# Patient Record
Sex: FEMALE | URBAN - METROPOLITAN AREA
[De-identification: names, ages, dates, MRNs, and addresses within clinical notes are randomized per-mention and may not be internally consistent; named-entity substitution may affect disease eponyms.]

---

## 2019-11-11 ENCOUNTER — OFFICE VISIT (OUTPATIENT)
Dept: FAMILY MEDICINE | Facility: CLINIC | Age: 29
End: 2019-11-11
Payer: COMMERCIAL

## 2019-11-11 VITALS
TEMPERATURE: 97.6 F | HEART RATE: 61 BPM | SYSTOLIC BLOOD PRESSURE: 108 MMHG | BODY MASS INDEX: 22.79 KG/M2 | RESPIRATION RATE: 14 BRPM | WEIGHT: 120.7 LBS | DIASTOLIC BLOOD PRESSURE: 80 MMHG | HEIGHT: 61 IN | OXYGEN SATURATION: 99 %

## 2019-11-11 DIAGNOSIS — S86.919D: ICD-10-CM

## 2019-11-11 DIAGNOSIS — F19.20 CHEMICAL DEPENDENCY (H): ICD-10-CM

## 2019-11-11 DIAGNOSIS — E55.9 VITAMIN D DEFICIENCY: ICD-10-CM

## 2019-11-11 DIAGNOSIS — L30.8 OTHER ECZEMA: Primary | ICD-10-CM

## 2019-11-11 LAB
DEPRECATED CALCIDIOL+CALCIFEROL SERPL-MC: 10 UG/L (ref 20–75)
HGB BLD-MCNC: 15.1 G/DL (ref 11.7–15.7)

## 2019-11-11 PROCEDURE — 36415 COLL VENOUS BLD VENIPUNCTURE: CPT | Performed by: FAMILY MEDICINE

## 2019-11-11 PROCEDURE — 82306 VITAMIN D 25 HYDROXY: CPT | Performed by: FAMILY MEDICINE

## 2019-11-11 PROCEDURE — 99203 OFFICE O/P NEW LOW 30 MIN: CPT | Mod: 25 | Performed by: FAMILY MEDICINE

## 2019-11-11 PROCEDURE — 90686 IIV4 VACC NO PRSV 0.5 ML IM: CPT | Performed by: FAMILY MEDICINE

## 2019-11-11 PROCEDURE — 90471 IMMUNIZATION ADMIN: CPT | Performed by: FAMILY MEDICINE

## 2019-11-11 PROCEDURE — 85018 HEMOGLOBIN: CPT | Performed by: FAMILY MEDICINE

## 2019-11-11 RX ORDER — TRIAMCINOLONE ACETONIDE 1 MG/G
CREAM TOPICAL 2 TIMES DAILY
Qty: 80 G | Refills: 0 | Status: SHIPPED | OUTPATIENT
Start: 2019-11-11

## 2019-11-11 ASSESSMENT — PAIN SCALES - GENERAL: PAINLEVEL: EXTREME PAIN (8)

## 2019-11-11 ASSESSMENT — MIFFLIN-ST. JEOR: SCORE: 1218.83

## 2019-11-11 NOTE — PROGRESS NOTES
"Subjective     Diana Rob is a 29 year old female who presents to clinic today for the following health issues:    HPI   Rash      Duration: 1 month    Description  Location: both hands  Itching: moderate    Intensity:  moderate    Accompanying signs and symptoms: redness    History (similar episodes/previous evaluation): None    Precipitating or alleviating factors:  New exposures:  None  Recent travel: no      Therapies tried and outcome: hydrocortisone cream -  not effective    [place] [facility]- [facility], (since may 2019, not been home)  Lives in [place]. Was advised to keep taking vitamin D and iron  Wondering if that is why having lower leg pain- aches, worse in am and better though the day.    PROBLEMS TO ADD ON...    BP Readings from Last 3 Encounters:   11/11/19 108/80    Wt Readings from Last 3 Encounters:   11/11/19 54.7 kg (120 lb 11.2 oz)                    PROBLEMS TO ADD ON...  Reviewed and updated as needed this visit by Provider  Allergies  Problems  Med Hx  Surg Hx         Review of Systems   ROS COMP: Constitutional, HEENT, cardiovascular, pulmonary, GI, , musculoskeletal, neuro, skin, endocrine and psych systems are negative, except as otherwise noted.      Objective    /80 (BP Location: Left arm, Patient Position: Sitting, Cuff Size: Adult Regular)   Pulse 61   Temp 97.6  F (36.4  C) (Oral)   Resp 14   Ht 1.556 m (5' 1.25\")   Wt 54.7 kg (120 lb 11.2 oz)   LMP 11/05/2019 (Approximate)   SpO2 99%   BMI 22.62 kg/m    Body mass index is 22.62 kg/m .  Physical Exam   Hands eczematous rash with dry peeling skin on the palm and dorsum of hands     GENERAL: healthy, alert and no distress  NECK: no adenopathy, no asymmetry, masses, or scars and thyroid normal to palpation  RESP: lungs clear to auscultation - no rales, rhonchi or wheezes  CV: regular rate and rhythm, normal S1 S2, no S3 or S4, no murmur, click or rub, no peripheral edema and peripheral pulses " strong  ABDOMEN: soft, nontender, no hepatosplenomegaly, no masses and bowel sounds normal  MS: no gross musculoskeletal defects noted, no edema  PSYCH: mentation appears normal, affect normal/bright    Diagnostic Test Results:  Labs reviewed in Epic        Assessment & Plan   28 yo female from [place] [facility] ([place])  Presents with rash     1. Other eczema  -avoid excess soaps, hot water  - triamcinolone (KENALOG) 0.1 % external cream; Apply topically 2 times daily  Dispense: 80 g; Refill: 0  - ok to stop in 2-3 weeks as needed    2. Vitamin D deficiency  Start high dose vitamin d 50,000 international unit iu weekly for 8 weeks  Lab only appointment after 8 week to check level    3. Muscle strain of lower leg, unspecified laterality, subsequent encounter  Warm pack as needed   Will check for anemia as well    Return in about 4 weeks (around 12/9/2019) for concerns,unresolved.    Jazz Rosario MD  [facility]

## 2019-11-11 NOTE — PATIENT INSTRUCTIONS
1. Other eczema  -avoid excess soaps, hot water  - triamcinolone (KENALOG) 0.1 % external cream; Apply topically 2 times daily  Dispense: 80 g; Refill: 0  - ok to stop in 2-3 weeks as needed    2. Muscle strain of lower leg, unspecified laterality, subsequent encounter  - Vitamin D Deficiency, as possible cause   - Hemoglobin, to rule out anema as a cause   - cholecalciferol (VITAMIN D3) 1000 units (25 mcg) capsule; Take 1 capsule (1,000 Units) by mouth daily  Dispense: 90 capsule; Refill: 3    3. Vitamin D deficiency  If normal level and aches continue, follow up as needed    - cholecalciferol (VITAMIN D3) 1000 units (25 mcg) capsule; Take 1 capsule (1,000 Units) by mouth daily  Dispense: 90 capsule; Refill: 3

## 2019-11-13 ENCOUNTER — TELEPHONE (OUTPATIENT)
Dept: FAMILY MEDICINE | Facility: CLINIC | Age: 29
End: 2019-11-13

## 2019-11-13 NOTE — TELEPHONE ENCOUNTER
Reason for Call:  Medication or medication refill:    Do you use a [facility]?  Name of the pharmacy and phone number for the current request:       [id number] [address]        Name of the medication requested: cholecalciferol (VITAMIN D3) 1000 units (25 mcg) capsule    Other request: The patient is a part of [place] [employer] and she said she cant purchase OTC medications because of that   She also cant afford it   Can we please prescribe?    Can we leave a detailed message on this number? YES    Phone number patient can be reached at: Cell number on file:    Telephone Information:   Mobile [phone]       Best Time: anytime    Call taken on 11/13/2019 at 11:25 AM by Jacki Quinteros

## 2019-11-14 PROBLEM — F19.20 CHEMICAL DEPENDENCY (H): Status: ACTIVE | Noted: 2019-11-14

## 2019-11-14 RX ORDER — ERGOCALCIFEROL 1.25 MG/1
50000 CAPSULE, LIQUID FILLED ORAL
Qty: 8 CAPSULE | Refills: 0 | Status: SHIPPED | OUTPATIENT
Start: 2019-11-14 | End: 2020-01-03

## 2019-11-14 NOTE — RESULT ENCOUNTER NOTE
[facility]    Please inform  Vitamin D deficiency  Prescription for high dose D is sent- take weekly for 8 weeks  Lab only appointment in 8 weeks for recheck  OK to continue over the counter Vitamin D as well, once daily      Normal hemoglobin    Follow up for unresolved concerns